# Patient Record
Sex: MALE | Race: WHITE | NOT HISPANIC OR LATINO | Employment: FULL TIME | URBAN - METROPOLITAN AREA
[De-identification: names, ages, dates, MRNs, and addresses within clinical notes are randomized per-mention and may not be internally consistent; named-entity substitution may affect disease eponyms.]

---

## 2020-07-15 ENCOUNTER — HOSPITAL ENCOUNTER (EMERGENCY)
Facility: HOSPITAL | Age: 30
End: 2020-07-16
Attending: EMERGENCY MEDICINE
Payer: COMMERCIAL

## 2020-07-15 DIAGNOSIS — F32.A DEPRESSION: ICD-10-CM

## 2020-07-15 DIAGNOSIS — R45.851 SUICIDAL IDEATION: Primary | ICD-10-CM

## 2020-07-15 LAB
ALBUMIN SERPL BCP-MCNC: 4.3 G/DL (ref 3.5–5)
ALP SERPL-CCNC: 42 U/L (ref 46–116)
ALT SERPL W P-5'-P-CCNC: 20 U/L (ref 12–78)
AMPHETAMINES SERPL QL SCN: NEGATIVE
ANION GAP SERPL CALCULATED.3IONS-SCNC: 8 MMOL/L (ref 4–13)
AST SERPL W P-5'-P-CCNC: 13 U/L (ref 5–45)
BARBITURATES UR QL: NEGATIVE
BASOPHILS # BLD AUTO: 0.03 THOUSANDS/ΜL (ref 0–0.1)
BASOPHILS NFR BLD AUTO: 1 % (ref 0–1)
BENZODIAZ UR QL: NEGATIVE
BILIRUB SERPL-MCNC: 0.5 MG/DL (ref 0.2–1)
BILIRUB UR QL STRIP: ABNORMAL
BUN SERPL-MCNC: 11 MG/DL (ref 5–25)
CALCIUM SERPL-MCNC: 9.1 MG/DL (ref 8.3–10.1)
CHLORIDE SERPL-SCNC: 104 MMOL/L (ref 100–108)
CLARITY UR: CLEAR
CO2 SERPL-SCNC: 29 MMOL/L (ref 21–32)
COCAINE UR QL: NEGATIVE
COLOR UR: YELLOW
CREAT SERPL-MCNC: 0.96 MG/DL (ref 0.6–1.3)
EOSINOPHIL # BLD AUTO: 0.19 THOUSAND/ΜL (ref 0–0.61)
EOSINOPHIL NFR BLD AUTO: 4 % (ref 0–6)
ERYTHROCYTE [DISTWIDTH] IN BLOOD BY AUTOMATED COUNT: 11.7 % (ref 11.6–15.1)
ETHANOL SERPL-MCNC: <3 MG/DL (ref 0–3)
GFR SERPL CREATININE-BSD FRML MDRD: 106 ML/MIN/1.73SQ M
GLUCOSE SERPL-MCNC: 94 MG/DL (ref 65–140)
GLUCOSE UR STRIP-MCNC: NEGATIVE MG/DL
HCT VFR BLD AUTO: 42.2 % (ref 36.5–49.3)
HGB BLD-MCNC: 14.3 G/DL (ref 12–17)
HGB UR QL STRIP.AUTO: NEGATIVE
IMM GRANULOCYTES # BLD AUTO: 0.02 THOUSAND/UL (ref 0–0.2)
IMM GRANULOCYTES NFR BLD AUTO: 0 % (ref 0–2)
KETONES UR STRIP-MCNC: ABNORMAL MG/DL
LEUKOCYTE ESTERASE UR QL STRIP: NEGATIVE
LYMPHOCYTES # BLD AUTO: 2.2 THOUSANDS/ΜL (ref 0.6–4.47)
LYMPHOCYTES NFR BLD AUTO: 40 % (ref 14–44)
MCH RBC QN AUTO: 31.1 PG (ref 26.8–34.3)
MCHC RBC AUTO-ENTMCNC: 33.9 G/DL (ref 31.4–37.4)
MCV RBC AUTO: 92 FL (ref 82–98)
METHADONE UR QL: NEGATIVE
MONOCYTES # BLD AUTO: 0.45 THOUSAND/ΜL (ref 0.17–1.22)
MONOCYTES NFR BLD AUTO: 8 % (ref 4–12)
NEUTROPHILS # BLD AUTO: 2.58 THOUSANDS/ΜL (ref 1.85–7.62)
NEUTS SEG NFR BLD AUTO: 47 % (ref 43–75)
NITRITE UR QL STRIP: NEGATIVE
NRBC BLD AUTO-RTO: 0 /100 WBCS
OPIATES UR QL SCN: NEGATIVE
OXYCODONE+OXYMORPHONE UR QL SCN: NEGATIVE
PCP UR QL: NEGATIVE
PH UR STRIP.AUTO: 6 [PH]
PLATELET # BLD AUTO: 224 THOUSANDS/UL (ref 149–390)
PMV BLD AUTO: 10.1 FL (ref 8.9–12.7)
POTASSIUM SERPL-SCNC: 3.9 MMOL/L (ref 3.5–5.3)
PROT SERPL-MCNC: 6.9 G/DL (ref 6.4–8.2)
PROT UR STRIP-MCNC: NEGATIVE MG/DL
RBC # BLD AUTO: 4.6 MILLION/UL (ref 3.88–5.62)
SARS-COV-2 RNA RESP QL NAA+PROBE: NEGATIVE
SODIUM SERPL-SCNC: 141 MMOL/L (ref 136–145)
SP GR UR STRIP.AUTO: 1.02 (ref 1–1.03)
THC UR QL: NEGATIVE
UROBILINOGEN UR QL STRIP.AUTO: 1 E.U./DL
WBC # BLD AUTO: 5.47 THOUSAND/UL (ref 4.31–10.16)

## 2020-07-15 PROCEDURE — 80053 COMPREHEN METABOLIC PANEL: CPT | Performed by: EMERGENCY MEDICINE

## 2020-07-15 PROCEDURE — 81003 URINALYSIS AUTO W/O SCOPE: CPT | Performed by: EMERGENCY MEDICINE

## 2020-07-15 PROCEDURE — 85025 COMPLETE CBC W/AUTO DIFF WBC: CPT | Performed by: EMERGENCY MEDICINE

## 2020-07-15 PROCEDURE — 87635 SARS-COV-2 COVID-19 AMP PRB: CPT | Performed by: EMERGENCY MEDICINE

## 2020-07-15 PROCEDURE — 36415 COLL VENOUS BLD VENIPUNCTURE: CPT | Performed by: EMERGENCY MEDICINE

## 2020-07-15 PROCEDURE — 99285 EMERGENCY DEPT VISIT HI MDM: CPT | Performed by: EMERGENCY MEDICINE

## 2020-07-15 PROCEDURE — 80320 DRUG SCREEN QUANTALCOHOLS: CPT | Performed by: EMERGENCY MEDICINE

## 2020-07-15 PROCEDURE — 80307 DRUG TEST PRSMV CHEM ANLYZR: CPT | Performed by: EMERGENCY MEDICINE

## 2020-07-15 PROCEDURE — 99285 EMERGENCY DEPT VISIT HI MDM: CPT

## 2020-07-15 PROCEDURE — 93005 ELECTROCARDIOGRAM TRACING: CPT

## 2020-07-15 RX ORDER — LOSARTAN POTASSIUM 100 MG/1
100 TABLET ORAL DAILY
COMMUNITY

## 2020-07-15 RX ORDER — AMLODIPINE BESYLATE 10 MG/1
10 TABLET ORAL DAILY
COMMUNITY

## 2020-07-15 NOTE — ED NOTES
36 yo MWM presents to the ER via police - was a "missing person" from earlier today - patient [place] in [address] with the intent to jump off in a suicide attempt - patient did look over the railing of the bridge but decided not to jump  The patient is not known to PES  Mood = "pretty neutral now"; last night - "can't describe - kind of numb"; past few days - "anxious" and unstable ("calm to agitated, say something and I change my mind - flip-flopping")  Stressors: "increased arguing with wife over the past 1 year with past 3 days of continuous arguing; wife health issues and money" ("patient also concerned his job will find out about his mental health issues and terminate him")  Symptoms include: +SI earlier (today as above); poor social supports; poor self esteem; anxiety (3-4 x's per week normally) - "daily now - increased heart rate, pressure in my head, can't think straight - in response to something"; etoh use from age 16 - last use was more than a month ago - 1 beer; cannabis use from age 24 with last use over 1 year ago  The patient denies: all current lethality; psychosis; paranoia; D/A use; hopelessness; anhedonia      Collateral with patient's wife, Elena Harvey [phone]: Patient has exhibited issues - anger, broke his hand punching the freezer about 6 months ago; we have been  about 1 year - the patient reminds me of his parents due to their mental health issues; the patient goes back on his word, the patient lies compulsively; last night - said he was going to [employer] and would be back in 30 minutes - I fell asleep and woke up 2 hours later - patient was not home - called his call phone - he had turned it off, after 6-7 hours, I called 911; the patient is terrified of losing his job; the patient is stressed out about my medical issues; had a huge fight on 7/13/20 - then patient slept, still arguing when he woke up; threatened suicide on 7/13/20 @ night; about 12 hours after the patient was missing - he called me from [facility] - said why he was there and said he was headed back - an eight hour drive - stopped to get the cat food and his phone ; fighting after he got home - told him I had to call the police back about him being home now - the patient got agitated - told him the police were going to take him to the hospital - then the patient denied everything after this conversation; I tld him he was not making sense and that he can't lie to the ; the patient is afraid of losing our health care insurance - the patient said to the officer - this is free isn't it? - the patient was confrontational with the "

## 2020-07-15 NOTE — ED NOTES
Post typing up this assessment - went back into room to discuss plan - inpt is the recommendation and explained voluntary versus committed via screening - the patient is considering his options  No [facility] beds are available  [address] - left voice mail @ 19:55  [address] - no beds available  [address] - they do have beds but seem reluctant about the patient's probable voluntary status  The patient asked if PES could call the insurance - called [facility] @ [phone] - spoke with [address] [facility]  [facility] returned call - no beds tonight  Referrals faxed to [facility] and [facility] @ 20:30 for bed availability on 7/16  [facility] called about 21:40 - they need Covid-19 results and an EKG - ER Attending advised (about 9 patients on the wait list)  EKG faxed to [facility] @ 22:00  ER staff updated

## 2020-07-15 NOTE — ED PROVIDER NOTES
History  Chief Complaint   Patient presents with    Suicidal     Pt brought in by police  Sts he has been stressed for a few months, arguing with his wife, caring for her because of her medical problems  Last night, pt drove to [place]- to the [place] the [address], because that was the closest bridge that he thought would kill him if he jumped  Sts he stood there for a few seconds, thought he might miss and land in the water causing injury and not death so he got back in the car and came home  There was a missing person's report filed by wife  34 y/o male presents with suicidal ideation and plan  Patient has been stressed because of his wife's medical problems, has had marital discord  He is depressed, went to [facility] to jump off the bridge with the intention to kill himself  Climbed on the bridge and then changed his mind,came back home  Police called by wife already and when questioned expressed suicidal attempt brought to the ED for further evaluation  Denies medical complaints  No history psychiatric, no inpatient psych hospitalization  History provided by:  Patient   used: No        Prior to Admission Medications   Prescriptions Last Dose Informant Patient Reported? Taking? amLODIPine (NORVASC) 10 mg tablet 7/15/2020 at Unknown time  Yes Yes   Sig: Take 10 mg by mouth daily   losartan (COZAAR) 100 MG tablet 7/15/2020 at Unknown time  Yes Yes   Sig: Take 100 mg by mouth daily      Facility-Administered Medications: None       Past Medical History:   Diagnosis Date    Hypertension        History reviewed  No pertinent surgical history  History reviewed  No pertinent family history  I have reviewed and agree with the history as documented      E-Cigarette/Vaping    E-Cigarette Use Current Some Day User      E-Cigarette/Vaping Substances     Social History     Tobacco Use    Smoking status: Current Some Day Smoker    Smokeless tobacco: Never Used Substance Use Topics    Alcohol use: Yes     Frequency: Monthly or less    Drug use: Never       Review of Systems   Constitutional: Negative  HENT: Negative  Eyes: Negative  Respiratory: Negative  Cardiovascular: Negative  Gastrointestinal: Negative  Endocrine: Negative  Genitourinary: Negative  Musculoskeletal: Negative  Skin: Negative  Allergic/Immunologic: Negative  Neurological: Negative  Hematological: Negative  Psychiatric/Behavioral: Positive for suicidal ideas  All other systems reviewed and are negative  Physical Exam  Physical Exam   Constitutional: He is oriented to person, place, and time  He appears well-developed and well-nourished  HENT:   Head: Normocephalic and atraumatic  Eyes: Pupils are equal, round, and reactive to light  EOM are normal    Neck: Normal range of motion  Neck supple  Cardiovascular: Normal rate and regular rhythm  Pulmonary/Chest: Effort normal and breath sounds normal    Abdominal: Soft  Bowel sounds are normal    Musculoskeletal: Normal range of motion  Neurological: He is alert and oriented to person, place, and time  Skin: Skin is warm and dry  Psychiatric:   Stressed, suicidal   Nursing note and vitals reviewed        Vital Signs  ED Triage Vitals [07/15/20 1826]   Temperature Pulse Respirations Blood Pressure SpO2   98 4 °F (36 9 °C) 96 18 144/96 100 %      Temp src Heart Rate Source Patient Position - Orthostatic VS BP Location FiO2 (%)   -- -- -- -- --      Pain Score       --           Vitals:    07/15/20 1826   BP: 144/96   Pulse: 96         Visual Acuity      ED Medications  Medications - No data to display    Diagnostic Studies  Results Reviewed     Procedure Component Value Units Date/Time    Ethanol [[id number]]  (Normal) Collected:  07/15/20 1855    Lab Status:  Final result Specimen:  Blood from Arm, Right Updated:  07/15/20 1923     Ethanol Lvl <3 mg/dL     Rapid drug screen, urine [[id number]] (Normal) Collected:  07/15/20 1857    Lab Status:  Final result Specimen:  Urine, Clean Catch Updated:  07/15/20 1920     Amph/Meth UR Negative     Barbiturate Ur Negative     Benzodiazepine Urine Negative     Cocaine Urine Negative     Methadone Urine Negative     Opiate Urine Negative     PCP Ur Negative     THC Urine Negative     Oxycodone Urine Negative    Narrative:       FOR MEDICAL PURPOSES ONLY  IF CONFIRMATION NEEDED PLEASE CONTACT THE LAB WITHIN 5 DAYS      Drug Screen Cutoff Levels:  AMPHETAMINE/METHAMPHETAMINES  1000 ng/mL  BARBITURATES     200 ng/mL  BENZODIAZEPINES     200 ng/mL  COCAINE      300 ng/mL  METHADONE      300 ng/mL  OPIATES      300 ng/mL  PHENCYCLIDINE     25 ng/mL  THC       50 ng/mL  OXYCODONE      100 ng/mL    Comprehensive metabolic panel [[id number]]  (Abnormal) Collected:  07/15/20 1855    Lab Status:  Final result Specimen:  Blood from Arm, Right Updated:  07/15/20 1920     Sodium 141 mmol/L      Potassium 3 9 mmol/L      Chloride 104 mmol/L      CO2 29 mmol/L      ANION GAP 8 mmol/L      BUN 11 mg/dL      Creatinine 0 96 mg/dL      Glucose 94 mg/dL      Calcium 9 1 mg/dL      AST 13 U/L      ALT 20 U/L      Alkaline Phosphatase 42 U/L      Total Protein 6 9 g/dL      Albumin 4 3 g/dL      Total Bilirubin 0 50 mg/dL      eGFR 106 ml/min/1 73sq m     Narrative:       [place] guidelines for Chronic Kidney Disease (CKD):     Stage 1 with normal or high GFR (GFR > 90 mL/min/1 73 square meters)    Stage 2 Mild CKD (GFR = 60-89 mL/min/1 73 square meters)    Stage 3A Moderate CKD (GFR = 45-59 mL/min/1 73 square meters)    Stage 3B Moderate CKD (GFR = 30-44 mL/min/1 73 square meters)    Stage 4 Severe CKD (GFR = 15-29 mL/min/1 73 square meters)    Stage 5 End Stage CKD (GFR <15 mL/min/1 73 square meters)  Note: GFR calculation is accurate only with a steady state creatinine    UA (URINE) with reflex to Scope [[id number]]  (Abnormal) Collected:  07/15/20 1856 Lab Status:  Final result Specimen:  Urine, Clean Catch Updated:  07/15/20 1905     Color, UA Yellow     Clarity, UA Clear     Specific Gravity, UA 1 025     pH, UA 6 0     Leukocytes, UA Negative     Nitrite, UA Negative     Protein, UA Negative mg/dl      Glucose, UA Negative mg/dl      Ketones, UA 15 (1+) mg/dl      Urobilinogen, UA 1 0 E U /dl      Bilirubin, UA Interference- unable to analyze     Blood, UA Negative    CBC and differential [[id number]] Collected:  07/15/20 1855    Lab Status:  Final result Specimen:  Blood from Arm, Right Updated:  07/15/20 1903     WBC 5 47 Thousand/uL      RBC 4 60 Million/uL      Hemoglobin 14 3 g/dL      Hematocrit 42 2 %      MCV 92 fL      MCH 31 1 pg      MCHC 33 9 g/dL      RDW 11 7 %      MPV 10 1 fL      Platelets 923 Thousands/uL      nRBC 0 /100 WBCs      Neutrophils Relative 47 %      Immat GRANS % 0 %      Lymphocytes Relative 40 %      Monocytes Relative 8 %      Eosinophils Relative 4 %      Basophils Relative 1 %      Neutrophils Absolute 2 58 Thousands/µL      Immature Grans Absolute 0 02 Thousand/uL      Lymphocytes Absolute 2 20 Thousands/µL      Monocytes Absolute 0 45 Thousand/µL      Eosinophils Absolute 0 19 Thousand/µL      Basophils Absolute 0 03 Thousands/µL                  No orders to display              Procedures  Procedures         ED Course       US AUDIT      Most Recent Value   Initial Alcohol Screen: US AUDIT-C    1  How often do you have a drink containing alcohol? 2 Filed at: 07/15/2020 1827   2  How many drinks containing alcohol do you have on a typical day you are drinking? 1 Filed at: 07/15/2020 1827   3a  Male UNDER 65: How often do you have five or more drinks on one occasion? 0 Filed at: 07/15/2020 1827   Audit-C Score  3 Filed at: 07/15/2020 1827                  CAMERON/DAST-10      Most Recent Value   How many times in the past year have you    Used an illegal drug or used a prescription medication for non-medical reasons? Never Filed at: 07/15/2020 1827                                [facility]  Number of Diagnoses or Management Options  Diagnosis management comments: Patient medically evaluated and cleared  Crisis consulted, pending inpatient psych referral         Amount and/or Complexity of Data Reviewed  Clinical lab tests: ordered and reviewed  Tests in the medicine section of CPT®: ordered and reviewed    Patient Progress  Patient progress: stable        Disposition  Final diagnoses:   Suicidal ideation   Depression     Time reflects when diagnosis was documented in both MDM as applicable and the Disposition within this note     Time User Action Codes Description Comment    7/15/2020  8:03 PM Adelfo Harman Add [R45 851] Suicidal ideation     7/15/2020  8:03 PM Adelfo Harman Add [F32 9] Depression       ED Disposition     None      Follow-up Information    None         Patient's Medications   Discharge Prescriptions    No medications on file     No discharge procedures on file      PDMP Review     None          ED Provider  Electronically Signed by           Giancarlo Liu DO  07/15/20 2003

## 2020-07-15 NOTE — ED NOTES
Pt sts he has felt disorganized, unmotivated and confused  Felt that he wanted to jump off a bridge  Has never been suicidal in the past   No hx psychiatric counseling, hospitalizations       Elvie Guidry RN  07/15/20 8474

## 2020-07-16 VITALS
OXYGEN SATURATION: 100 % | RESPIRATION RATE: 17 BRPM | TEMPERATURE: 97.8 F | HEART RATE: 101 BPM | SYSTOLIC BLOOD PRESSURE: 130 MMHG | DIASTOLIC BLOOD PRESSURE: 85 MMHG | WEIGHT: 148 LBS

## 2020-07-16 RX ORDER — AMLODIPINE BESYLATE 5 MG/1
10 TABLET ORAL DAILY
Status: DISCONTINUED | OUTPATIENT
Start: 2020-07-16 | End: 2020-07-16 | Stop reason: HOSPADM

## 2020-07-16 RX ORDER — LOSARTAN POTASSIUM 50 MG/1
100 TABLET ORAL DAILY
Status: DISCONTINUED | OUTPATIENT
Start: 2020-07-16 | End: 2020-07-16 | Stop reason: HOSPADM

## 2020-07-16 RX ADMIN — LOSARTAN POTASSIUM 100 MG: 50 TABLET, FILM COATED ORAL at 11:23

## 2020-07-16 RX ADMIN — AMLODIPINE BESYLATE 10 MG: 5 TABLET ORAL at 11:22

## 2020-07-16 NOTE — EMTALA/ACUTE CARE TRANSFER
[address] [address]  Dept: [phone]      EMTALA TRANSFER CONSENT    NAME Raphael Cruz                                         1990                              MRN [id number]    I have been informed of my rights regarding examination, treatment, and transfer   by Dr Zainab Ronquillo DO    Benefits: Specialized equipment and/or services available at the receiving facility (Include comment)________________________    Risks: Potential for delay in receiving treatment, Potential deterioration of medical condition, Increased discomfort during transfer, Possible worsening of condition or death during transfer      Consent for Transfer:  I acknowledge that my medical condition has been evaluated and explained to me by the emergency department physician or other qualified medical person and/or my attending physician, who has recommended that I be transferred to the service of  Accepting Physician: Dr Dakota Hawk at [facility] Name, [address] : [facility]  The above potential benefits of such transfer, the potential risks associated with such transfer, and the probable risks of not being transferred have been explained to me, and I fully understand them  The doctor has explained that, in my case, the benefits of transfer outweigh the risks  I agree to be transferred  I authorize the performance of emergency medical procedures and treatments upon me in both transit and upon arrival at the receiving facility  Additionally, I authorize the release of any and all medical records to the receiving facility and request they be transported with me, if possible  I understand that the safest mode of transportation during a medical emergency is an ambulance and that the Hospital advocates the use of this mode of transport   Risks of traveling to the receiving facility by car, including absence of medical control, life sustaining equipment, such as oxygen, and medical personnel has been explained to me and I fully understand them  (TANK CORRECT BOX BELOW)  [  ]  I consent to the stated transfer and to be transported by ambulance/helicopter  [  ]  I consent to the stated transfer, but refuse transportation by ambulance and accept full responsibility for my transportation by car  I understand the risks of non-ambulance transfers and I exonerate the Hospital and its staff from any deterioration in my condition that results from this refusal     X___________________________________________    DATE  20  TIME________  Signature of patient or legally responsible individual signing on patient behalf           RELATIONSHIP TO PATIENT_________________________          Provider Certification    NAME Savanna Tolentino                                         1990                              MRN [id number]    A medical screening exam was performed on the above named patient  Based on the examination:    Condition Necessitating Transfer The primary encounter diagnosis was Suicidal ideation  A diagnosis of Depression was also pertinent to this visit      Patient Condition: The patient has been stabilized such that within reasonable medical probability, no material deterioration of the patient condition or the condition of the unborn child(inder) is likely to result from the transfer    Reason for Transfer: Level of Care needed not available at this facility    Transfer Requirements: Arsenio Schumacher   · Space available and qualified personnel available for treatment as acknowledged by    · Agreed to accept transfer and to provide appropriate medical treatment as acknowledged by       Dr Lita Lipscomb  · Appropriate medical records of the examination and treatment of the patient are provided at the time of transfer   [address] _______  · Transfer will be performed by qualified personnel from    and appropriate transfer equipment as required, including the use of necessary and appropriate life support measures  Provider Certification: I have examined the patient and explained the following risks and benefits of being transferred/refusing transfer to the patient/family:  General risk, such as traffic hazards, adverse weather conditions, rough terrain or turbulence, possible failure of equipment (including vehicle or aircraft), or consequences of actions of persons outside the control of the transport personnel      Based on these reasonable risks and benefits to the patient and/or the unborn child(inder), and based upon the information available at the time of the patients examination, I certify that the medical benefits reasonably to be expected from the provision of appropriate medical treatments at another medical facility outweigh the increasing risks, if any, to the individuals medical condition, and in the case of labor to the unborn child, from effecting the transfer      X____________________________________________ DATE 07/16/20        TIME_______      ORIGINAL - SEND TO MEDICAL RECORDS   COPY - SEND WITH PATIENT DURING TRANSFER

## 2020-07-16 NOTE — ED NOTES
Pt ambulated to bathroom, gait steady, given breakfast try, no distress noted       Audelia Vivas RN  07/16/20 8926

## 2020-07-16 NOTE — ED NOTES
7/16/20 @ [facility] 63 713:  PES received call from Arsenio weber at [facility]; will review referral and call back as soon as possible  Babita Boyer, MS    0840: PES faxed COVID-19 negative result to [facility]  PES spoke to Duane Baird, who will call following Psychiatrist review  MS Lillian    1000: PES met with patient and updated  [facility]ES received call from Arsenio weber at [facility]:  Patient is accepted at [facility]  Patient is accepted by Dr Dakota Hawk per [facility]  Nurse report is to be called to [phone] prior to patient transfer  MS Lillian    1340: PES called for precert: Insurance Authorization for admission:   Phone call placed to [employer] number: [phone]  Spoke to Maureen Ted  05 days approved  Level of care: inpatient  Review on 7/20/20 with Hortencia Cantu @ [phone]  Authorization # [id number]  Insurance Authorization for Transportation:    Phone call placed to   Phone number **  Spoke to [facility] #: No authorization required; submit claim  MS Lillian    1118: PES called for transport: Will call back with time  Babita Boyer, [address]: PES called and cancelled [facility] referral   Babita Boyer, [address] returned call:  Transportation is arranged with [facility]  Transportation is scheduled for 1300  PES faxed insurance precert information, voluntary consent form, and COVID-19 screening form, to [facility]  Babita Boyer, [address]: [facility] arrived and transported patient with his belongings to [facility]  Patient was calm and cooperative    Bbaita Boyer, MS

## 2020-07-16 NOTE — ED NOTES
R/c'd pt report  Pt is voluntary and bed search has begun  1:1 and safety checks for SI  Pt is on stretcher in ED room sleeping       Will Gonzalez RN  07/16/20 8036

## 2020-07-16 NOTE — ED NOTES
Resting quietly, lunch ordered, am meds given   JASSON Clemens at bedside     John Steve RN  07/16/20 1493

## 2020-07-16 NOTE — ED NOTES
Pt sleeping on stretcher in no distress  1:1 and paper safety checks continue for DARIAN Lewis, MICHELL  07/16/20 0895

## 2020-07-16 NOTE — ED NOTES
[facility] [phone]     Juan Talamantes RN  07/16/20 0571

## 2020-07-16 NOTE — ED NOTES
Patient denies feeling suicidal at this time, " feels stupid not that he is here", pt remains calm and cooperative, observation maintained       Kim Sanchez RN  07/16/20 0520

## 2020-07-16 NOTE — EMTALA/ACUTE CARE TRANSFER
[address] [address]  Dept: [phone]      EMTALA TRANSFER CONSENT    NAME Yousuf Carroll                                         1990                              MRN [id number]    I have been informed of my rights regarding examination, treatment, and transfer   by Dr Pipo Conley DO    Benefits: Specialized equipment and/or services available at the receiving facility (Include comment)________________________    Risks: Potential for delay in receiving treatment, Potential deterioration of medical condition, Increased discomfort during transfer, Possible worsening of condition or death during transfer      { ED EMTALA TRANSFER CHOICES:[id number]}    I authorize the performance of emergency medical procedures and treatments upon me in both transit and upon arrival at the receiving facility  Additionally, I authorize the release of any and all medical records to the receiving facility and request they be transported with me, if possible  I understand that the safest mode of transportation during a medical emergency is an ambulance and that the Hospital advocates the use of this mode of transport  Risks of traveling to the receiving facility by car, including absence of medical control, life sustaining equipment, such as oxygen, and medical personnel has been explained to me and I fully understand them  (TANK CORRECT BOX BELOW)  [  ]  I consent to the stated transfer and to be transported by ambulance/helicopter  [  ]  I consent to the stated transfer, but refuse transportation by ambulance and accept full responsibility for my transportation by car    I understand the risks of non-ambulance transfers and I exonerate the Hospital and its staff from any deterioration in my condition that results from this refusal     X___________________________________________    DATE  20  TIME________  Signature of patient or legally responsible individual signing on patient behalf           RELATIONSHIP TO PATIENT_________________________          Provider Certification    NAME Cheyanne MASCORRO 1990                              MRN [id number]    A medical screening exam was performed on the above named patient  Based on the examination:    Condition Necessitating Transfer The primary encounter diagnosis was Suicidal ideation  A diagnosis of Depression was also pertinent to this visit  Patient Condition: The patient has been stabilized such that within reasonable medical probability, no material deterioration of the patient condition or the condition of the unborn child(inder) is likely to result from the transfer    Reason for Transfer: Level of Care needed not available at this facility    Transfer Requirements: Arsenio Schumacher   · Space available and qualified personnel available for treatment as acknowledged by    · Agreed to accept transfer and to provide appropriate medical treatment as acknowledged by       Dr John Paredes  · Appropriate medical records of the examination and treatment of the patient are provided at the time of transfer   [facility], [address] _______  · Transfer will be performed by qualified personnel from    and appropriate transfer equipment as required, including the use of necessary and appropriate life support measures      Provider Certification: I have examined the patient and explained the following risks and benefits of being transferred/refusing transfer to the patient/family:  General risk, such as traffic hazards, adverse weather conditions, rough terrain or turbulence, possible failure of equipment (including vehicle or aircraft), or consequences of actions of persons outside the control of the transport personnel      Based on these reasonable risks and benefits to the patient and/or the unborn child(inder), and based upon the information available at the time of the patients examination, I certify that the medical benefits reasonably to be expected from the provision of appropriate medical treatments at another medical facility outweigh the increasing risks, if any, to the individuals medical condition, and in the case of labor to the unborn child, from effecting the transfer      X____________________________________________ DATE 07/16/20        TIME_______      ORIGINAL - SEND TO MEDICAL RECORDS   COPY - SEND WITH PATIENT DURING TRANSFER

## 2020-07-16 NOTE — ED NOTES
Pt continues to sleep on stretcher  Noted to reposition self in bed  1:1 observation maintained       Gloria Chen RN  07/16/20 [id number]

## 2020-07-16 NOTE — ED NOTES
Patient is accepted at [facility] [place]        Ketan Young RN  07/16/20 2549

## 2020-07-20 LAB
ATRIAL RATE: 47 BPM
P AXIS: 58 DEGREES
PR INTERVAL: 164 MS
QRS AXIS: 79 DEGREES
QRSD INTERVAL: 98 MS
QT INTERVAL: 424 MS
QTC INTERVAL: 375 MS
T WAVE AXIS: 70 DEGREES
VENTRICULAR RATE: 47 BPM

## 2020-07-20 PROCEDURE — 93010 ELECTROCARDIOGRAM REPORT: CPT | Performed by: INTERNAL MEDICINE

## 2020-11-24 ENCOUNTER — TELEPHONE (OUTPATIENT)
Dept: PSYCHIATRY | Facility: CLINIC | Age: 30
End: 2020-11-24